# Patient Record
Sex: FEMALE | Race: WHITE | NOT HISPANIC OR LATINO | Employment: OTHER | URBAN - METROPOLITAN AREA
[De-identification: names, ages, dates, MRNs, and addresses within clinical notes are randomized per-mention and may not be internally consistent; named-entity substitution may affect disease eponyms.]

---

## 2017-09-18 DIAGNOSIS — Z12.31 OTHER SCREENING MAMMOGRAM: ICD-10-CM

## 2018-10-02 ENCOUNTER — HOSPITAL ENCOUNTER (OUTPATIENT)
Facility: HOSPITAL | Age: 73
Discharge: HOSPICE | End: 2018-10-03
Attending: EMERGENCY MEDICINE | Admitting: INTERNAL MEDICINE
Payer: MEDICARE

## 2018-10-02 DIAGNOSIS — C54.1 ADENOCARCINOMA OF THE ENDOMETRIUM/UTERUS: ICD-10-CM

## 2018-10-02 DIAGNOSIS — E83.52 HYPERCALCEMIA: Primary | ICD-10-CM

## 2018-10-02 DIAGNOSIS — C79.9 METASTATIC ADENOCARCINOMA: ICD-10-CM

## 2018-10-02 LAB
BASOPHILS # BLD AUTO: 0.01 K/UL
BASOPHILS NFR BLD: 0.1 %
DIFFERENTIAL METHOD: ABNORMAL
EOSINOPHIL # BLD AUTO: 0 K/UL
EOSINOPHIL NFR BLD: 0 %
ERYTHROCYTE [DISTWIDTH] IN BLOOD BY AUTOMATED COUNT: 20.3 %
HCT VFR BLD AUTO: 32.9 %
HGB BLD-MCNC: 10.6 G/DL
LYMPHOCYTES # BLD AUTO: 1.8 K/UL
LYMPHOCYTES NFR BLD: 11.1 %
MCH RBC QN AUTO: 25.4 PG
MCHC RBC AUTO-ENTMCNC: 32.2 G/DL
MCV RBC AUTO: 79 FL
MONOCYTES # BLD AUTO: 2 K/UL
MONOCYTES NFR BLD: 12.3 %
NEUTROPHILS # BLD AUTO: 12.1 K/UL
NEUTROPHILS NFR BLD: 76.5 %
PLATELET # BLD AUTO: 387 K/UL
PMV BLD AUTO: 10.3 FL
RBC # BLD AUTO: 4.17 M/UL
WBC # BLD AUTO: 15.81 K/UL

## 2018-10-02 PROCEDURE — 80053 COMPREHEN METABOLIC PANEL: CPT

## 2018-10-02 PROCEDURE — 96361 HYDRATE IV INFUSION ADD-ON: CPT

## 2018-10-02 PROCEDURE — 99285 EMERGENCY DEPT VISIT HI MDM: CPT

## 2018-10-02 PROCEDURE — 96374 THER/PROPH/DIAG INJ IV PUSH: CPT

## 2018-10-02 PROCEDURE — 93010 ELECTROCARDIOGRAM REPORT: CPT | Mod: ,,, | Performed by: INTERNAL MEDICINE

## 2018-10-02 PROCEDURE — 83690 ASSAY OF LIPASE: CPT

## 2018-10-02 PROCEDURE — 85025 COMPLETE CBC W/AUTO DIFF WBC: CPT

## 2018-10-03 VITALS
WEIGHT: 194.19 LBS | OXYGEN SATURATION: 95 % | HEIGHT: 62 IN | BODY MASS INDEX: 35.73 KG/M2 | DIASTOLIC BLOOD PRESSURE: 92 MMHG | RESPIRATION RATE: 22 BRPM | TEMPERATURE: 98 F | HEART RATE: 113 BPM | SYSTOLIC BLOOD PRESSURE: 152 MMHG

## 2018-10-03 PROBLEM — E83.52 HYPERCALCEMIA: Status: ACTIVE | Noted: 2018-10-03

## 2018-10-03 PROBLEM — C79.9 METASTATIC ADENOCARCINOMA: Status: ACTIVE | Noted: 2018-10-03

## 2018-10-03 PROBLEM — G93.41 ACUTE METABOLIC ENCEPHALOPATHY: Status: ACTIVE | Noted: 2018-10-03

## 2018-10-03 PROBLEM — C54.1 ENDOMETRIAL CARCINOMA: Status: ACTIVE | Noted: 2018-10-03

## 2018-10-03 PROBLEM — C54.1 ADENOCARCINOMA OF THE ENDOMETRIUM/UTERUS: Status: ACTIVE | Noted: 2018-10-03

## 2018-10-03 LAB
ALBUMIN SERPL BCP-MCNC: 2.7 G/DL
ALP SERPL-CCNC: 75 U/L
ALT SERPL W/O P-5'-P-CCNC: 13 U/L
ANION GAP SERPL CALC-SCNC: 11 MMOL/L
AST SERPL-CCNC: 51 U/L
BILIRUB SERPL-MCNC: 0.5 MG/DL
BILIRUB UR QL STRIP: NEGATIVE
BUN SERPL-MCNC: 30 MG/DL
CALCIUM SERPL-MCNC: 16 MG/DL
CHLORIDE SERPL-SCNC: 99 MMOL/L
CLARITY UR: CLEAR
CO2 SERPL-SCNC: 27 MMOL/L
COLOR UR: YELLOW
CREAT SERPL-MCNC: 0.7 MG/DL
EST. GFR  (AFRICAN AMERICAN): >60 ML/MIN/1.73 M^2
EST. GFR  (NON AFRICAN AMERICAN): >60 ML/MIN/1.73 M^2
GLUCOSE SERPL-MCNC: 102 MG/DL
GLUCOSE UR QL STRIP: NEGATIVE
HGB UR QL STRIP: NEGATIVE
KETONES UR QL STRIP: NEGATIVE
LACTATE SERPL-SCNC: 1.7 MMOL/L
LEUKOCYTE ESTERASE UR QL STRIP: NEGATIVE
LIPASE SERPL-CCNC: 87 U/L
NITRITE UR QL STRIP: NEGATIVE
PH UR STRIP: 6 [PH] (ref 5–8)
POTASSIUM SERPL-SCNC: 3.7 MMOL/L
PROT SERPL-MCNC: 7.6 G/DL
PROT UR QL STRIP: NEGATIVE
SODIUM SERPL-SCNC: 137 MMOL/L
SP GR UR STRIP: 1.02 (ref 1–1.03)
URN SPEC COLLECT METH UR: NORMAL
UROBILINOGEN UR STRIP-ACNC: NEGATIVE EU/DL

## 2018-10-03 PROCEDURE — 87040 BLOOD CULTURE FOR BACTERIA: CPT

## 2018-10-03 PROCEDURE — G0378 HOSPITAL OBSERVATION PER HR: HCPCS

## 2018-10-03 PROCEDURE — 63600175 PHARM REV CODE 636 W HCPCS: Performed by: INTERNAL MEDICINE

## 2018-10-03 PROCEDURE — 25000003 PHARM REV CODE 250: Performed by: INTERNAL MEDICINE

## 2018-10-03 PROCEDURE — 81003 URINALYSIS AUTO W/O SCOPE: CPT

## 2018-10-03 PROCEDURE — 25000003 PHARM REV CODE 250: Performed by: EMERGENCY MEDICINE

## 2018-10-03 PROCEDURE — 83605 ASSAY OF LACTIC ACID: CPT

## 2018-10-03 RX ORDER — MORPHINE SULFATE 4 MG/ML
4 INJECTION, SOLUTION INTRAMUSCULAR; INTRAVENOUS EVERY 4 HOURS PRN
Status: DISCONTINUED | OUTPATIENT
Start: 2018-10-03 | End: 2018-10-03 | Stop reason: HOSPADM

## 2018-10-03 RX ORDER — SODIUM CHLORIDE 9 MG/ML
INJECTION, SOLUTION INTRAVENOUS CONTINUOUS
Status: DISCONTINUED | OUTPATIENT
Start: 2018-10-03 | End: 2018-10-03 | Stop reason: HOSPADM

## 2018-10-03 RX ADMIN — SODIUM CHLORIDE 1000 ML: 0.9 INJECTION, SOLUTION INTRAVENOUS at 12:10

## 2018-10-03 RX ADMIN — SODIUM CHLORIDE: 0.9 INJECTION, SOLUTION INTRAVENOUS at 06:10

## 2018-10-03 RX ADMIN — MORPHINE SULFATE 4 MG: 4 INJECTION INTRAVENOUS at 10:10

## 2018-10-03 NOTE — ASSESSMENT & PLAN NOTE
Reviewed recent endometrial and cervical mass biopsy done at [facility] on 09/27/2018.  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  With extensive metastases as mentioned above.

## 2018-10-03 NOTE — H&P
[facility] Medicine  History & Physical    Patient Name: Vannessa Ryan  MRN: [id number]  Admission Date: 10/2/2018  Attending Physician: Bhavin Alcazar MD  Primary Care Provider: Matilde Best MD         Patient information was obtained from patient, relative(s), past medical records and ER records.     Subjective:     Principal Problem:Hypercalcemia of malignancy    Chief Complaint:   Chief Complaint   Patient presents with    Altered Mental Status     new admit to NH today, ems state the NH says she does not fit their reported baseline. daughter enroute        HPI: Ms. Ryan is a 72-year-old  female, initially admitted on 9/24/18 to [facility] after sustaining a fall.  She was diagnosed with sepsis secondary to pneumonia.  Initial chest x-ray revealed multiple nodules.  Follow-up CT chest abdomen and pelvis revealed extensive metastatic disease.  Patient underwent transvaginal ultrasound on 9/26/18 that showed endometrial and cervical masses.  Patient underwent biopsy on 9/27/18, final report came back on 10/01/18 with invasive endometrial adenocarcinoma.  Patient was discharged to [facility] on 10/02/18, to be followed up by Dr. Mejia (gynecological oncology).  Patient was unable to see Dr. Mejia yesterday.  Patient was sent to the [facility] ED due to worsening confusion and SNF's inability to care for the patient due to high acuity of care.  Patient has been at [facility] for less than 24 hr.  Patient was sent to the ED for further evaluation.    CT head done today is negative for metastatic disease. Reviewed imaging studies from outside facility ([facility]), revealed extensive metastatic disease involving the thoracic and lumbar spine with lytic lesions, extensive abdominal lymphadenopathy, mediastinal lymphadenopathy, multiple metastatic pulmonary nodules.  Laboratory data revealed calcium of 16.  Last  calcium 13.3 on 10/01/18 at [facility].    Discussed with patient's two daughters and one son in law at the bedside.  Patient is a DNR/DNI.  Discussed therapeutic options versus comfort care.  Patient's two daughters and son-in-law in agreement with keeping their mother comfortable, requested for hospice.  In fact they are pursuing two hospice facilities and will let  know the choice of hospice agency later this morning.      Past Medical History:   Diagnosis Date    Allergy     Cataract     Osteoporosis     Spine    Primary osteoarthritis of right knee 5/9/2015    Suicide and self-inflicted injury by other specified means     Tried to starve her self 23 years ago after a divorce. Hospitilized for one day. No residual probems now.     Type II or unspecified type diabetes mellitus without mention of complication, not stated as uncontrolled     Uterine cancer        Past Surgical History:   Procedure Laterality Date    APPENDECTOMY      EYE SURGERY  06/03/2014    IUD removal      KNEE SURGERY         Review of patient's allergies indicates:   Allergen Reactions    Sulfa (sulfonamide antibiotics) Anaphylaxis    Alendronate Diarrhea    Penicillins Hives    Shellfish containing products Swelling     nausea       No current facility-administered medications on file prior to encounter.      Current Outpatient Medications on File Prior to Encounter   Medication Sig    aspirin 81 MG Chew Take 81 mg by mouth once daily.    blood sugar diagnostic Strp Check Blood sugar before breakfast and 2 hours after biggest meal    lancets (ACCU-CHEK SOFTCLIX LANCETS) Misc Check Blood sugar before breakfast and 2 hours after biggest meal    blood-glucose meter (FREESTYLE SYSTEM KIT) kit Use as instructed    pravastatin (PRAVACHOL) 10 MG tablet Take 1 tablet (10 mg total) by mouth once daily.    [DISCONTINUED] diclofenac sodium (VOLTAREN) 1 % Gel Apply 2 g topically once daily.    [DISCONTINUED] naproxen sodium  (ANAPROX) 220 MG tablet Take 220 mg by mouth 2 (two) times daily with meals.     Family History     Problem Relation (Age of Onset)    Diabetes Mother    Heart disease Father    Stroke Brother, Brother        Tobacco Use    Smoking status: Never Smoker    Smokeless tobacco: Never Used   Substance and Sexual Activity    Alcohol use: No    Drug use: No    Sexual activity: No     Review of Systems   Unable to perform ROS: Mental status change     Objective:     Vital Signs (Most Recent):  Temp: 97.9 °F (36.6 °C) (10/02/18 2235)  Pulse: 104 (10/03/18 0515)  Resp: (!) 22 (10/03/18 0230)  BP: (!) 156/67 (10/03/18 0332)  SpO2: 95 % (10/03/18 0332) Vital Signs (24h Range):  Temp:  [97.9 °F (36.6 °C)] 97.9 °F (36.6 °C)  Pulse:  [] 104  Resp:  [18-22] 22  SpO2:  [95 %-98 %] 95 %  BP: (133-164)/(59-74) 156/67     Weight: 88.1 kg (194 lb 3.2 oz)  Body mass index is 35.52 kg/m².    Physical Exam   Constitutional: She appears lethargic. No distress.   Elderly encephalopathic,  female.  Two daughters and a son-in-law at the bedside.   HENT:   Head: Normocephalic and atraumatic.   Eyes: Conjunctivae and EOM are normal. No scleral icterus.   Neck: Normal range of motion.   Cardiovascular: Normal rate and intact distal pulses. A regularly irregular rhythm present.   No murmur heard.  Pulmonary/Chest: No tachypnea. No respiratory distress. She has rhonchi.   Abdominal: Soft. She exhibits no distension. There is no tenderness.   Musculoskeletal: She exhibits no edema.   Neurological: She appears lethargic. She is disoriented. She exhibits normal muscle tone.   Skin: Skin is warm. She is not diaphoretic. No erythema.   Nursing note and vitals reviewed.        CRANIAL NERVES     CN III, IV, VI   Extraocular motions are normal.        Significant Labs:   BMP:   Recent Labs   Lab  10/02/18   2254   GLU  102   NA  137   K  3.7   CL  99   CO2  27   BUN  30*   CREATININE  0.7   CALCIUM  16.0*     CBC:   Recent Labs   Lab   10/02/18   2254   WBC  15.81*   HGB  10.6*   HCT  32.9*   PLT  387*     CMP:   Recent Labs   Lab  10/02/18   2254   NA  137   K  3.7   CL  99   CO2  27   GLU  102   BUN  30*   CREATININE  0.7   CALCIUM  16.0*   PROT  7.6   ALBUMIN  2.7*   BILITOT  0.5   ALKPHOS  75   AST  51*   ALT  13   ANIONGAP  11   EGFRNONAA  >60     Lactic Acid:   Recent Labs   Lab  10/03/18   0007   LACTATE  1.7     All pertinent labs within the past 24 hours have been reviewed.    Significant Imaging: I have reviewed and interpreted all pertinent imaging results/findings within the past 24 hours.     Imaging Results          CT Head Without Contrast (Final result)  Result time 10/02/18 23:47:38    Final result by Dav Miller MD (10/02/18 23:47:38)                 Impression:      No acute findings.    All CT scans at this facility are performed  using dose modulation techniques as appropriate to performed exam including the following:  automated exposure control; adjustment of mA and/or kV according to the patients size (this includes techniques or standardized protocols for targeted exams where dose is matched to indication/reason for exam: i.e. extremities or head);  iterative reconstruction technique.      Electronically signed by: Dav Miller MD  Date:    10/02/2018  Time:    23:47             Narrative:    EXAMINATION:  CT HEAD WITHOUT CONTRAST    CLINICAL HISTORY:  Confusion/delirium, altered LOC, unexplained;    FINDINGS:  No intracranial hemorrhage or acute findings are demonstrated.  Mild cerebral atrophy.  Low-density changes in the white matter suggests chronic small vessel ischemia.  The visualized paranasal sinuses are clear. The calvarium is intact.                               X-Ray Chest AP Portable (Final result)  Result time 10/02/18 23:28:49    Final result by Dav Miller MD (10/02/18 23:28:49)                 Impression:      Innumerable bilateral pulmonary nodules raising the question of metastatic  disease.      Electronically signed by: Dav Miller MD  Date:    10/02/2018  Time:    23:28             Narrative:    EXAMINATION:  XR CHEST AP PORTABLE    CLINICAL HISTORY:  chest pain;    FINDINGS:  Heart is normal in size.  The aorta is atherosclerotic.  There are 2 pulmonary nodules scattered throughout both lung fields raising the question of diffuse metastatic disease.                              09/26/2018:  Transvaginal ultrasound of the pelvis:  Centrally trying hyperechoic mass representing an endometrial mass.  Neither ovary is identified.    09/25/2018: CT CHEST ABDOMEN PELVIS W CONTRAST        CHEST: The lungs demonstrate innumerable pulmonary nodules, ranging in size from a few millimeters up to 2.9 cm and the right lower lobe. Some of the larger nodules. Have some central necrosis and possible early central cavitation, for example and medial    left lower lobe nodule on image 42 of series 2. There is atelectasis/consolidation in the dependent portions of the lungs. Negative for pleural or pericardial effusions.        Multiple enlarged hilar and mediastinal lymph nodes, the largest of which is in the subcarinal region measuring 3.2 cm in greatest length with central necrosis. Negative for inferior neck or axillary lymph nodes. The airways are patent. The thyroid gland    is normal. The esophagus is normal.         ABDOMEN: The gallbladder is full of gallstones. There is fatty infiltration of the pancreas. The liver is heterogeneous in appearance, with a low density nodule or cyst in the caudate lobe measuring 1 cm. The liver, spleen and gallbladder otherwise     appear normal. The pancreas is unremarkable. Kidneys and adrenal glands are normal. The biliary tree is normal.        There are multiple enlarged necrotic retroperitoneal and bilateral iliac chain lymph nodes, the largest of which is in the left jugular chain measuring 2.3 cm on image 99 of series 2.        Negative for ascites or  inflammatory changes noted within the abdomen or pelvis.        Vascular calcifications are present without aneurysmal change. The portal vein is patent.        The bowel appears normal. I do not see a normal or an abnormal appendix. Scattered clonic diverticula.        PELVIS: The urinary bladder is unremarkable.         There is a device in the uterine cavity, which is adjacent to an irregular hypodense mass that appears to be centered in the endometrium, which measures approximately 6.6 x 6.2 x 5.2 cm. The rest of the female pelvic organs are normal. Numerous pelvic     phleboliths noted.        Atrophy of the abdominal wall musculature. Tiny fat filled umbilical hernia. Abdominal wall is otherwise intact.        There is diffuse heterogeneity to the lower thoracic and lumbar vertebral bodies and bilateral pelvis which could be related to osteopenia versus innumerable bone lesions. Marked degenerative changes of both shoulder girdles, with left greater than right    shoulder joint effusions noted, with intra-articular loose bodies present. Negative for significant spinal canal stenosis.         Negative for groin adenopathy.        IMPRESSION:     1. Markedly abnormal study. There are in numerable pulmonary nodules ranging in size from a few millimeters up to 2.9 cm in the right lower lobe. Many of the larger nodules are essentially necrotic one appearing to be frankly cavitary in the medial right    lower lobe. This is associated with multiple enlarged necrotic appearing mediastinal lymph nodes (largest in the subcarinal region measuring 3.2 cm), as well as retroperitoneal and iliac chain lymph nodes (largest of which measures 2.3 cm on the left     jugular chain). There is a 6.6 x 6.2 x 5.2 cm soft tissue mass involving the uterus, replacing the endometrium and surrounding an intrauterine device. There is heterogeneity to the lower thoracic and lumbar spine vertebra as well as the bilateral     hemipelvis,  which could be related to marked disuse osteopenia versus innumerable tiny bony lytic lesions. Findings are most consistent with a widely metastatic process involving multiple chest and abdominal lymph nodes, all of the lungs and most likely     most of the osseous structures visualized, possibly arising from an endometrial source.    2. There is dependent atelectasis in the lung bases.    3. There are marked degenerative changes of both shoulder girdles, with large lateral shoulder joint effusions and intrathecal loose bodies.    4. Gallbladder completely full of gallstones.    5. Other nonemergent findings as described above.        2018:  Surgical pathology report, endometrial mass and cervical mass biopsy:    Department of Pathology                                             SURGICAL PATHOLOGY REPORT                Allan Michaud MD                                                                                      Medical Director                                                                                      Phone: [phone]                                                                                      Fax: [phone]  Name:       LETICIA TRAN  Case#:      MILLI-                                                Date Collected:  2018, 10:59  :        1945   Age: 72 Y   Sex: F                            Date Received:   2018, 13:12  MR#:        [id number]                                                    Service:         MEDU    Room#: 3216  Adm. Dr:    CYNTHIA MOYER, hospitalist                              Acct#:           [id number]  Req. Dr:    CLAU ALFONSO  .  .  ADDENDUM 1:   Dr. Alfonso's nurse Dominic was notified of this assessment at 11:05 AM on 10/1/2018.  Electronically Signed By:  ALLAN MICHAUD MD  Reported: 10/01/2018  11:10  .  FINAL DIAGNOSIS:  A: Cervical mass, resection: Invasive endometrial adenocarcinoma, FIGO grade 2-3.  B: Endometrial  "mass, curettage: Invasive endometrial adenocarcinoma, FIGO grade 2-3.  C: Gross diagnosis only: Intrauterine contraceptive device.  .  Comment:  Another staff pathologist has reviewed specimen A.  Electronically Signed By:  BRAVO MICHAUD MD  Reported: 09/28/2018  15:56  .  .  Specimen(s) Submitted:  (A) CERVIX BIOPSY , Cervical Mass  (B) ENDOMETRIAL BIOPSY , Endometrial Mass  (C) HARDWARE , Inrauterine Device  .  Clinical History:  Preop diagnosis: Postmenopausal bleeding, Endometrial mass, Intrauterine device  Time in formalin greater than 6 hours and less than 48 hours.  .  Gross Description:  A: Specimen is received in formalin labeled patient's name, medical record number, and "cervical mass", and consists  of Telfa pad with pieces of pink polypoid tissue that are 1.5 x 0.7 x 0.3 cm in aggregate. Specimen is entirely  submitted in one cassette.  B: Specimen is received in formalin labeled patient's name, medical record number, and "endometrial polyp", and  consists of Telfa pad with pieces of tissue and mucus that are 3 x 2.5 x 0.5 cm in aggregate. Specimen is entirely  submitted in one cassette.  C: Specimen is received in formalin labeled patient's name, medical record number, and "intrauterine device", and  consists of a beige plastic T shaped specimen, the horizontal aspects of the T are coiled inward. The diameter is 0.2  cm, it is 7.2 cm vertically; the T-shaped limbs are 5.5 and 6 cm in length. Specimen is submitted for gross  identification only.  ..  Microscopic Description:  A, B: Gland forming tumor and solid areas of tumor demonstrate moderate to marked nuclear  pleomorphism and frequent mitotic figures. In specimen A, tumor cells in both glandular and solid areas demonstrate  positive staining for vimentin. Approximately 20-30% of tumor cells stain with Ki-67. Tumor cells stain negatively  with p63. Adequate immunostain controls were provided.  .  Technical-Part A  88300 x 1  88305 x 2  10003 x " 1  88341 x 2  Professional-Part B  88300 x 1  88305 x 2  88342 x 1  88341 x 2  [facility]  *  [id number]  *  [place] [address]  *  [facility]# [id number].        I have independently reviewed and interpreted the EKG.     I have independently reviewed all pertinent labs within the past 24 hours.    I have independently reviewed, visualized and interpreted all pertinent imaging results within the past 24 hours and discussed the findings with the ED physician, Dr. Grace.      Assessment/Plan:     * Hypercalcemia of malignancy    Calcium 16 today.  Recently was 13 two days ago.  Unclear patient received bisphosphonate at [facility].  Received 2 L normal saline boluses in the ED.  Continue with normal saline at 125 cc/hour.  Family choosing to proceed with comfort care/hospice.          Acute metabolic encephalopathy    Secondary to severe hypercalcemia of malignancy.  CT head negative for metastases.        Adenocarcinoma of the endometrium/uterus    Reviewed recent endometrial and cervical mass biopsy done at [facility] on 09/27/2018.  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  With extensive metastases as mentioned above.          Metastatic adenocarcinoma    Extensive metastases to bilateral lungs, thoracic and lumbar spine, mediastinal lymphadenopathy, intra-abdominal lymphadenopathy.  Patient currently encephalopathic from hypercalcemia, 16.  Family wishes to proceed with comfort care.   consulted for hospice placement.            VTE Risk Mitigation (From admission, onward)        Ordered     IP VTE HIGH RISK PATIENT  Once      10/03/18 0200             Bhavin Alcazar MD  Department of Hospital Medicine   [facility]

## 2018-10-03 NOTE — SUBJECTIVE & OBJECTIVE
Past Medical History:   Diagnosis Date    Allergy     Cataract     Osteoporosis     Spine    Primary osteoarthritis of right knee 5/9/2015    Suicide and self-inflicted injury by other specified means     Tried to starve her self 23 years ago after a divorce. Hospitilized for one day. No residual probems now.     Type II or unspecified type diabetes mellitus without mention of complication, not stated as uncontrolled     Uterine cancer        Past Surgical History:   Procedure Laterality Date    APPENDECTOMY      EYE SURGERY  06/03/2014    IUD removal      KNEE SURGERY         Review of patient's allergies indicates:   Allergen Reactions    Sulfa (sulfonamide antibiotics) Anaphylaxis    Alendronate Diarrhea    Penicillins Hives    Shellfish containing products Swelling     nausea       No current facility-administered medications on file prior to encounter.      Current Outpatient Medications on File Prior to Encounter   Medication Sig    aspirin 81 MG Chew Take 81 mg by mouth once daily.    blood sugar diagnostic Strp Check Blood sugar before breakfast and 2 hours after biggest meal    lancets (ACCU-CHEK SOFTCLIX LANCETS) Misc Check Blood sugar before breakfast and 2 hours after biggest meal    blood-glucose meter (FREESTYLE SYSTEM KIT) kit Use as instructed    pravastatin (PRAVACHOL) 10 MG tablet Take 1 tablet (10 mg total) by mouth once daily.    [DISCONTINUED] diclofenac sodium (VOLTAREN) 1 % Gel Apply 2 g topically once daily.    [DISCONTINUED] naproxen sodium (ANAPROX) 220 MG tablet Take 220 mg by mouth 2 (two) times daily with meals.     Family History     Problem Relation (Age of Onset)    Diabetes Mother    Heart disease Father    Stroke Brother, Brother        Tobacco Use    Smoking status: Never Smoker    Smokeless tobacco: Never Used   Substance and Sexual Activity    Alcohol use: No    Drug use: No    Sexual activity: No     Review of Systems   Unable to perform ROS: Mental  status change     Objective:     Vital Signs (Most Recent):  Temp: 97.9 °F (36.6 °C) (10/02/18 2235)  Pulse: 104 (10/03/18 0515)  Resp: (!) 22 (10/03/18 0230)  BP: (!) 156/67 (10/03/18 0332)  SpO2: 95 % (10/03/18 0332) Vital Signs (24h Range):  Temp:  [97.9 °F (36.6 °C)] 97.9 °F (36.6 °C)  Pulse:  [] 104  Resp:  [18-22] 22  SpO2:  [95 %-98 %] 95 %  BP: (133-164)/(59-74) 156/67     Weight: 88.1 kg (194 lb 3.2 oz)  Body mass index is 35.52 kg/m².    Physical Exam   Constitutional: She appears lethargic. No distress.   Elderly encephalopathic,  female.  Two daughters and a son-in-law at the bedside.   HENT:   Head: Normocephalic and atraumatic.   Eyes: Conjunctivae and EOM are normal. No scleral icterus.   Neck: Normal range of motion.   Cardiovascular: Normal rate and intact distal pulses. A regularly irregular rhythm present.   No murmur heard.  Pulmonary/Chest: No tachypnea. No respiratory distress. She has rhonchi.   Abdominal: Soft. She exhibits no distension. There is no tenderness.   Musculoskeletal: She exhibits no edema.   Neurological: She appears lethargic. She is disoriented. She exhibits normal muscle tone.   Skin: Skin is warm. She is not diaphoretic. No erythema.   Nursing note and vitals reviewed.        CRANIAL NERVES     CN III, IV, VI   Extraocular motions are normal.        Significant Labs:   BMP:   Recent Labs   Lab  10/02/18   2254   GLU  102   NA  137   K  3.7   CL  99   CO2  27   BUN  30*   CREATININE  0.7   CALCIUM  16.0*     CBC:   Recent Labs   Lab  10/02/18   2254   WBC  15.81*   HGB  10.6*   HCT  32.9*   PLT  387*     CMP:   Recent Labs   Lab  10/02/18   2254   NA  137   K  3.7   CL  99   CO2  27   GLU  102   BUN  30*   CREATININE  0.7   CALCIUM  16.0*   PROT  7.6   ALBUMIN  2.7*   BILITOT  0.5   ALKPHOS  75   AST  51*   ALT  13   ANIONGAP  11   EGFRNONAA  >60     Lactic Acid:   Recent Labs   Lab  10/03/18   0007   LACTATE  1.7     All pertinent labs within the past 24 hours  have been reviewed.    Significant Imaging: I have reviewed and interpreted all pertinent imaging results/findings within the past 24 hours.     Imaging Results          CT Head Without Contrast (Final result)  Result time 10/02/18 23:47:38    Final result by Dav Miller MD (10/02/18 23:47:38)                 Impression:      No acute findings.    All CT scans at this facility are performed  using dose modulation techniques as appropriate to performed exam including the following:  automated exposure control; adjustment of mA and/or kV according to the patients size (this includes techniques or standardized protocols for targeted exams where dose is matched to indication/reason for exam: i.e. extremities or head);  iterative reconstruction technique.      Electronically signed by: Dav Miller MD  Date:    10/02/2018  Time:    23:47             Narrative:    EXAMINATION:  CT HEAD WITHOUT CONTRAST    CLINICAL HISTORY:  Confusion/delirium, altered LOC, unexplained;    FINDINGS:  No intracranial hemorrhage or acute findings are demonstrated.  Mild cerebral atrophy.  Low-density changes in the white matter suggests chronic small vessel ischemia.  The visualized paranasal sinuses are clear. The calvarium is intact.                               X-Ray Chest AP Portable (Final result)  Result time 10/02/18 23:28:49    Final result by Dav Miller MD (10/02/18 23:28:49)                 Impression:      Innumerable bilateral pulmonary nodules raising the question of metastatic disease.      Electronically signed by: Dav Miller MD  Date:    10/02/2018  Time:    23:28             Narrative:    EXAMINATION:  XR CHEST AP PORTABLE    CLINICAL HISTORY:  chest pain;    FINDINGS:  Heart is normal in size.  The aorta is atherosclerotic.  There are 2 pulmonary nodules scattered throughout both lung fields raising the question of diffuse metastatic disease.                              09/26/2018:  Transvaginal ultrasound of the  pelvis:  Centrally trying hyperechoic mass representing an endometrial mass.  Neither ovary is identified.    09/25/2018: CT CHEST ABDOMEN PELVIS W CONTRAST        CHEST: The lungs demonstrate innumerable pulmonary nodules, ranging in size from a few millimeters up to 2.9 cm and the right lower lobe. Some of the larger nodules. Have some central necrosis and possible early central cavitation, for example and medial    left lower lobe nodule on image 42 of series 2. There is atelectasis/consolidation in the dependent portions of the lungs. Negative for pleural or pericardial effusions.        Multiple enlarged hilar and mediastinal lymph nodes, the largest of which is in the subcarinal region measuring 3.2 cm in greatest length with central necrosis. Negative for inferior neck or axillary lymph nodes. The airways are patent. The thyroid gland    is normal. The esophagus is normal.         ABDOMEN: The gallbladder is full of gallstones. There is fatty infiltration of the pancreas. The liver is heterogeneous in appearance, with a low density nodule or cyst in the caudate lobe measuring 1 cm. The liver, spleen and gallbladder otherwise     appear normal. The pancreas is unremarkable. Kidneys and adrenal glands are normal. The biliary tree is normal.        There are multiple enlarged necrotic retroperitoneal and bilateral iliac chain lymph nodes, the largest of which is in the left jugular chain measuring 2.3 cm on image 99 of series 2.        Negative for ascites or inflammatory changes noted within the abdomen or pelvis.        Vascular calcifications are present without aneurysmal change. The portal vein is patent.        The bowel appears normal. I do not see a normal or an abnormal appendix. Scattered clonic diverticula.        PELVIS: The urinary bladder is unremarkable.         There is a device in the uterine cavity, which is adjacent to an irregular hypodense mass that appears to be centered in the endometrium,  which measures approximately 6.6 x 6.2 x 5.2 cm. The rest of the female pelvic organs are normal. Numerous pelvic     phleboliths noted.        Atrophy of the abdominal wall musculature. Tiny fat filled umbilical hernia. Abdominal wall is otherwise intact.        There is diffuse heterogeneity to the lower thoracic and lumbar vertebral bodies and bilateral pelvis which could be related to osteopenia versus innumerable bone lesions. Marked degenerative changes of both shoulder girdles, with left greater than right    shoulder joint effusions noted, with intra-articular loose bodies present. Negative for significant spinal canal stenosis.         Negative for groin adenopathy.        IMPRESSION:     1. Markedly abnormal study. There are in numerable pulmonary nodules ranging in size from a few millimeters up to 2.9 cm in the right lower lobe. Many of the larger nodules are essentially necrotic one appearing to be frankly cavitary in the medial right    lower lobe. This is associated with multiple enlarged necrotic appearing mediastinal lymph nodes (largest in the subcarinal region measuring 3.2 cm), as well as retroperitoneal and iliac chain lymph nodes (largest of which measures 2.3 cm on the left     jugular chain). There is a 6.6 x 6.2 x 5.2 cm soft tissue mass involving the uterus, replacing the endometrium and surrounding an intrauterine device. There is heterogeneity to the lower thoracic and lumbar spine vertebra as well as the bilateral     hemipelvis, which could be related to marked disuse osteopenia versus innumerable tiny bony lytic lesions. Findings are most consistent with a widely metastatic process involving multiple chest and abdominal lymph nodes, all of the lungs and most likely     most of the osseous structures visualized, possibly arising from an endometrial source.    2. There is dependent atelectasis in the lung bases.    3. There are marked degenerative changes of both shoulder girdles, with  large lateral shoulder joint effusions and intrathecal loose bodies.    4. Gallbladder completely full of gallstones.    5. Other nonemergent findings as described above.        2018:  Surgical pathology report, endometrial mass and cervical mass biopsy:    Department of Pathology                                             SURGICAL PATHOLOGY REPORT                Allan Michaud MD                                                                                      Medical Director                                                                                      Phone: [phone]                                                                                      Fax: [phone]  Name:       LETICIA TRAN  Case#:      MILLI-                                                Date Collected:  2018, 10:59  :        1945   Age: 72 Y   Sex: F                            Date Received:   2018, 13:12  MR#:        [id number]                                                    Service:         MEDU    Room#: 3216  Adm. Dr:    CYNTHIA MOYER, hospitalist                              Acct#:           [id number]  Req. Dr:    CLAU ALFONSO  .  .  ADDENDUM 1:   Dr. Alfonso's nurse Dominic was notified of this assessment at 11:05 AM on 10/1/2018.  Electronically Signed By:  ALLAN MICHAUD MD  Reported: 10/01/2018  11:10  .  FINAL DIAGNOSIS:  A: Cervical mass, resection: Invasive endometrial adenocarcinoma, FIGO grade 2-3.  B: Endometrial mass, curettage: Invasive endometrial adenocarcinoma, FIGO grade 2-3.  C: Gross diagnosis only: Intrauterine contraceptive device.  .  Comment:  Another staff pathologist has reviewed specimen A.  Electronically Signed By:  ALLAN MICHAUD MD  Reported: 2018  15:56  .  .  Specimen(s) Submitted:  (A) CERVIX BIOPSY , Cervical Mass  (B) ENDOMETRIAL BIOPSY , Endometrial Mass  (C) HARDWARE , Inrauterine Device  .  Clinical History:  Preop diagnosis: Postmenopausal  "bleeding, Endometrial mass, Intrauterine device  Time in formalin greater than 6 hours and less than 48 hours.  .  Gross Description:  A: Specimen is received in formalin labeled patient's name, medical record number, and "cervical mass", and consists  of Telfa pad with pieces of pink polypoid tissue that are 1.5 x 0.7 x 0.3 cm in aggregate. Specimen is entirely  submitted in one cassette.  B: Specimen is received in formalin labeled patient's name, medical record number, and "endometrial polyp", and  consists of Telfa pad with pieces of tissue and mucus that are 3 x 2.5 x 0.5 cm in aggregate. Specimen is entirely  submitted in one cassette.  C: Specimen is received in formalin labeled patient's name, medical record number, and "intrauterine device", and  consists of a beige plastic T shaped specimen, the horizontal aspects of the T are coiled inward. The diameter is 0.2  cm, it is 7.2 cm vertically; the T-shaped limbs are 5.5 and 6 cm in length. Specimen is submitted for gross  identification only.  ..  Microscopic Description:  A, B: Gland forming tumor and solid areas of tumor demonstrate moderate to marked nuclear  pleomorphism and frequent mitotic figures. In specimen A, tumor cells in both glandular and solid areas demonstrate  positive staining for vimentin. Approximately 20-30% of tumor cells stain with Ki-67. Tumor cells stain negatively  with p63. Adequate immunostain controls were provided.  .  Technical-Part A  88300 x 1  88305 x 2  88342 x 1  88341 x 2  Professional-Part B  88300 x 1  88305 x 2  88342 x 1  88341 x 2  [facility]  *  [address]  *  [place] [address]  *  [facility]# [id number].      "

## 2018-10-03 NOTE — ASSESSMENT & PLAN NOTE
Calcium 16 today.  Recently was 13 two days ago.  Unclear patient received bisphosphonate at [facility].  Received 2 L normal saline boluses in the ED.  Continue with normal saline at 125 cc/hour.  Family choosing to proceed with comfort care/hospice.

## 2018-10-03 NOTE — PROGRESS NOTES
Called to give report to Vinnie or Melany at [facility], both were in patient's rooms at the time and my name and spectralink number were left with the  to call back. Will continue to monitor. Patient resting comfortably and family remains at bedside.

## 2018-10-03 NOTE — PLAN OF CARE
Problem: Patient Care Overview  Goal: Plan of Care Review  Outcome: Ongoing (interventions implemented as appropriate)  Pt in bed AAOx4.   Plan of Care reviewed, Verbalized understanding.  Patient remained free from falls, fall precautions in place.   Pt is A-Fib on monitor. VSS.   PIV CDI with NS running at 125ml/hr.   Call bell and personal belongings within reach.   Hourly rounding complete. Reminded to call for assistance.   No complaints at this time.   Will continue to monitor.

## 2018-10-03 NOTE — ASSESSMENT & PLAN NOTE
Extensive metastases to bilateral lungs, thoracic and lumbar spine, mediastinal lymphadenopathy, intra-abdominal lymphadenopathy.  Patient currently encephalopathic from hypercalcemia, 16.  Family wishes to proceed with comfort care.   consulted for hospice placement.

## 2018-10-03 NOTE — PLAN OF CARE
CM met with family at bedside to discuss discharge plan/needs. Patient is unresponsive. Family reports patient's condition declined before she could be admitted into [facility]. Family understands patient's condition is likely irreversible and would like to proceed with inpatient hospice. Family chose [facility]. CM obtained choice form and submitted referral to [facility].     Contact: Haley Saavedra, meghana, [phone]       10/03/18 1141   Discharge Assessment   Assessment Type Discharge Planning Assessment   Confirmed/corrected address and phone number on facesheet? Yes   Assessment information obtained from? Caregiver  (Patient unresponsive. Spoke with daughters, Haley and Lexis. )   Communicated expected length of stay with patient/caregiver yes   Prior to hospitilization cognitive status: Coma/Sedated/Intubated   Prior to hospitalization functional status: Completely Dependent   Current cognitive status: Coma/Sedated/Intubated   Current Functional Status: Completely Dependent   Facility Arrived From: ([facility])   Lives With facility resident   Able to Return to Prior Arrangements no   Is patient able to care for self after discharge? No   Who are your caregiver(s) and their phone number(s)? (Haley Saavedra, daughter, [phone])   Patient's perception of discharge disposition hospice/medical facility   Readmission Within The Last 30 Days no previous admission in last 30 days   Patient currently being followed by outpatient case management? No   Patient currently receives any other outside agency services? No   Do you have any problems affording any of your prescribed medications? No   Discharge Plan A Inpatient Hospice   Patient/Family In Agreement With Plan yes

## 2018-10-03 NOTE — DISCHARGE SUMMARY
[facility] Hospital Medicine  Discharge Summary      Patient Name: Vannessa Ryan  MRN: [id number]  Admission Date: 10/2/2018  Hospital Length of Stay: 0 days  Discharge Date and Time:  10/03/2018 3:24 PM  Attending Physician: Mitchell Rosenthal MD   Discharging Provider: Gillian Salmon NP  Primary Care Provider: Matilde Best MD      HPI:   Ms. Ryan is a 72-year-old  female, initially admitted on 9/24/18 to [facility] after sustaining a fall.  She was diagnosed with sepsis secondary to pneumonia.  Initial chest x-ray revealed multiple nodules.  Follow-up CT chest abdomen and pelvis revealed extensive metastatic disease.  Patient underwent transvaginal ultrasound on 9/26/18 that showed endometrial and cervical masses.  Patient underwent biopsy on 9/27/18, final report came back on 10/01/18 with invasive endometrial adenocarcinoma.  Patient was discharged to [facility] on 10/02/18, to be followed up by Dr. Mejia (gynecological oncology).  Patient was unable to see Dr. Mejia yesterday.  Patient was sent to the [facility] ED due to worsening confusion and SNF's inability to care for the patient due to high acuity of care.  Patient has been at [facility] for less than 24 hr.  Patient was sent to the ED for further evaluation.    CT head done today is negative for metastatic disease. Reviewed imaging studies from outside facility ([facility]), revealed extensive metastatic disease involving the thoracic and lumbar spine with lytic lesions, extensive abdominal lymphadenopathy, mediastinal lymphadenopathy, multiple metastatic pulmonary nodules.  Laboratory data revealed calcium of 16.  Last calcium 13.3 on 10/01/18 at [facility].    Discussed with patient's two daughters and one son in law at the bedside.  Patient is a DNR/DNI.  Discussed therapeutic options versus comfort care.  Patient's two daughters and son-in-law in agreement with  keeping their mother comfortable, requested for hospice.  In fact they are pursuing two hospice facilities and will let  know the choice of hospice agency later this morning.      * No surgery found *      Hospital Course:   The pt was placed in observation for hypercalcemia secondary to extensive metastatic disease involving the thoracic and lumbar spine with lytic lesions, extensive abdominal lymphadenopathy, mediastinal lymphadenopathy, multiple metastatic pulmonary nodules.  Laboratory data revealed calcium of 16. Pt is DNR. Discussed therapeutic options versus comfort care. Hospice philosophy discussed with pt's family. Patient's two daughters and son-in-law in agreement and requested inpatient hospice at [facility]. The pt was discharged to the [facility].              Consults:   Consults (From admission, onward)        Status Ordering Provider     Inpatient consult to Social Work  Once     Provider:  (Not yet assigned)    NIRAV Potts                Final Active Diagnoses:    Diagnosis Date Noted POA    PRINCIPAL PROBLEM:  Hypercalcemia of malignancy [E83.52] 10/03/2018 Yes    Adenocarcinoma of the endometrium/uterus [C54.1] 10/03/2018 Yes    Metastatic adenocarcinoma [C79.9] 10/03/2018 Yes    Acute metabolic encephalopathy [G93.41] 10/03/2018 Yes      Problems Resolved During this Admission:       Discharged Condition: stable    Disposition: Hospice/Medical Facility    Follow Up: Hospice provider in one day     Patient Instructions:      Activity as tolerated       Significant Diagnostic Studies:   Imaging Results          CT Head Without Contrast (Final result)  Result time 10/02/18 23:47:38    Final result by Dav Miller MD (10/02/18 23:47:38)                 Impression:      No acute findings.    All CT scans at this facility are performed  using dose modulation techniques as appropriate to performed exam including the following:  automated exposure control;  adjustment of mA and/or kV according to the patients size (this includes techniques or standardized protocols for targeted exams where dose is matched to indication/reason for exam: i.e. extremities or head);  iterative reconstruction technique.      Electronically signed by: Dav Miller MD  Date:    10/02/2018  Time:    23:47             Narrative:    EXAMINATION:  CT HEAD WITHOUT CONTRAST    CLINICAL HISTORY:  Confusion/delirium, altered LOC, unexplained;    FINDINGS:  No intracranial hemorrhage or acute findings are demonstrated.  Mild cerebral atrophy.  Low-density changes in the white matter suggests chronic small vessel ischemia.  The visualized paranasal sinuses are clear. The calvarium is intact.                               X-Ray Chest AP Portable (Final result)  Result time 10/02/18 23:28:49    Final result by Dav Miller MD (10/02/18 23:28:49)                 Impression:      Innumerable bilateral pulmonary nodules raising the question of metastatic disease.      Electronically signed by: Dav Miller MD  Date:    10/02/2018  Time:    23:28             Narrative:    EXAMINATION:  XR CHEST AP PORTABLE    CLINICAL HISTORY:  chest pain;    FINDINGS:  Heart is normal in size.  The aorta is atherosclerotic.  There are 2 pulmonary nodules scattered throughout both lung fields raising the question of diffuse metastatic disease.                                Pending Diagnostic Studies:     None         Medications:  Reconciled Home Medications:      Medication List      STOP taking these medications    aspirin 81 MG Chew     blood sugar diagnostic Strp     blood-glucose meter kit  Commonly known as:  FREESTYLE SYSTEM KIT     diclofenac sodium 1 % Gel  Commonly known as:  VOLTAREN     lancets Misc  Commonly known as:  ACCU-CHEK SOFTCLIX LANCETS     naproxen sodium 220 MG tablet  Commonly known as:  ANAPROX     pravastatin 10 MG tablet  Commonly known as:  PRAVACHOL            Indwelling Lines/Drains at time  of discharge:   Lines/Drains/Airways          None          Time spent on the discharge of patient: 42 minutes  Patient was seen and examined on the date of discharge and determined to be suitable for discharge.         Gillian Salmon NP  Department of Hospital Medicine  [facility]

## 2018-10-03 NOTE — HPI
Ms. Ryan is a 72-year-old  female, initially admitted on 9/24/18 to [facility] after sustaining a fall.  She was diagnosed with sepsis secondary to pneumonia.  Initial chest x-ray revealed multiple nodules.  Follow-up CT chest abdomen and pelvis revealed extensive metastatic disease.  Patient underwent transvaginal ultrasound on 9/26/18 that showed endometrial and cervical masses.  Patient underwent biopsy on 9/27/18, final report came back on 10/01/18 with invasive endometrial adenocarcinoma.  Patient was discharged to [facility] on 10/02/18, to be followed up by Dr. Mejia (gynecological oncology).  Patient was unable to see Dr. Mejia yesterday.  Patient was sent to the [facility] ED due to worsening confusion and SNF's inability to care for the patient due to high acuity of care.  Patient has been at [facility] for less than 24 hr.  Patient was sent to the ED for further evaluation.    CT head done today is negative for metastatic disease. Reviewed imaging studies from outside facility ([facility]), revealed extensive metastatic disease involving the thoracic and lumbar spine with lytic lesions, extensive abdominal lymphadenopathy, mediastinal lymphadenopathy, multiple metastatic pulmonary nodules.  Laboratory data revealed calcium of 16.  Last calcium 13.3 on 10/01/18 at [facility].    Discussed with patient's two daughters and one son in law at the bedside.  Patient is a DNR/DNI.  Discussed therapeutic options versus comfort care.  Patient's two daughters and son-in-law in agreement with keeping their mother comfortable, requested for hospice.  In fact they are pursuing two hospice facilities and will let  know the choice of hospice agency later this morning.

## 2018-10-03 NOTE — PLAN OF CARE
Angela from [facility] contacted [facility] to report pt is their resident. Tia reports pt is their for SNF. She reports pt can return to their facility and they will hold her bed. [facility] sent pt's information via [employer] to [facility].

## 2018-10-03 NOTE — PROGRESS NOTES
Spoke with Vinnie at [facility] and report was given. He will handle getting [facility] to pick patient up for transport to them.

## 2018-10-03 NOTE — ED PROVIDER NOTES
SCRIBE #1 NOTE: I, Sonya De Leon, am scribing for, and in the presence of, Kirti Klein Do, MD. I have scribed the entire note.      History      Chief Complaint   Patient presents with    Altered Mental Status     new admit to NH today, ems state the NH says she does not fit their reported baseline. daughter enroute       Review of patient's allergies indicates:   Allergen Reactions    Sulfa (sulfonamide antibiotics) Anaphylaxis    Alendronate Diarrhea    Penicillins Hives    Shellfish containing products Swelling     nausea        HPI   HPI    10/2/2018, 10:49 PM   History obtained from the daughters      History of Present Illness: Vannessa Ryan is a 72 y.o. female patient who presents to the Emergency Department for worsening AMS which onset gradually a week ago. Daughters report that the patient was evaluated at [facility] by Dr. Wooten after a fall that occurred 1 week ago. Last seen normal was before fall on 09/24/2018. Daughters state that the pt was primarly dx with uterine cancer. Pt was admitted on 09/25/2018 to the hospital for Hypokalemia DDD, Elevated CK, Generalized weakness, Sepsis due to pneumonia (HCC), Pneumonia of both lungs due to infectious organism, and Leukocytosis. Per daughters, pt's AMS increasingly worsened 6 days ago. Daughters state that the pt is not eating, drinking, or talking. Pt was placed at [facility] for rehabilitation and further care today, but was referred to the ED for evaluation of HTN. Pt has not seen an Oncologist. HPI limited due to pt's AMS.       CT Abdomen/Pelvis 09/24/2018  IMPRESSION:   1. Markedly abnormal study. There are in numerable pulmonary nodules ranging in size from a few millimeters up to 2.9 cm in the right lower lobe. Many of the larger nodules are essentially necrotic one appearing to be frankly cavitary in the medial right  lower lobe. This is associated with multiple enlarged necrotic appearing mediastinal lymph nodes  (largest in the subcarinal region measuring 3.2 cm), as well as retroperitoneal and iliac chain lymph nodes (largest of which measures 2.3 cm on the left   jugular chain). There is a 6.6 x 6.2 x 5.2 cm soft tissue mass involving the uterus, replacing the endometrium and surrounding an intrauterine device. There is heterogeneity to the lower thoracic and lumbar spine vertebra as well as the bilateral   hemipelvis, which could be related to marked disuse osteopenia versus innumerable tiny bony lytic lesions. Findings are most consistent with a widely metastatic process involving multiple chest and abdominal lymph nodes, all of the lungs and most likely   most of the osseous structures visualized, possibly arising from an endometrial source.  2. There is dependent atelectasis in the lung bases.  3. There are marked degenerative changes of both shoulder girdles, with large lateral shoulder joint effusions and intrathecal loose bodies.  4. Gallbladder completely full of gallstones.  5. Other nonemergent findings as described above.   Assessment  Invasive endometrial adenocarcinoma, grade 2-3 with suspected metastasis to lungs and lymph nodes      Diagnostic Study Results:  Xr Lumbar Spine 2 Or 3 Vws    Result Date: 9/24/2018  REASON FOR EXAM: pain, fall TECHNICAL FACTORS: Two or three views COMPARISON: None FINDINGS: There is no evidence of acute fracture. There is no evidence of subluxation. There is fusion of the L4 and L5 vertebral bodies. Disc space narrowing and osteophytosis are present at multiple lumbar levels. Facet osteoarthritis is present in the lower lumbar spine. A catheter overlies the pelvis. IMPRESSION: Degenerative disc disease and facet osteoarthritis of the lumbar spine. Electronically signed by Jagdish Montero MD on 9/24/2018 8:27 PM     Xr Hips Bilateral 2 Views    Result Date: 9/24/2018  REASON FOR EXAM: pain, fall TECHNICAL FACTORS: 2 views of both hips COMPARISON: None FINDINGS: There is no  evidence of acute fracture. There is no evidence of subluxation. Joint spaces are maintained. No significant soft tissue swelling is identified. IMPRESSION: No acute findings. Electronically signed by Jagdish Montero MD on 9/24/2018 8:28 PM     Ct Head Wo Contrast    Result Date: 9/24/2018  REASON FOR EXAM: fall TECHNICAL FACTORS: 5 mm contiguous axial CT images were obtained from the foramen magnum to the skull vertex. COMPARISON: None FINDINGS: The ventricles are normal in size and position. There is no evidence of acute intracranial hemorrhage or infarct. There is no evidence of mass, mass effect, or midline shift. Decreased attenuation is present in the periventricular and subcortical white matter. The visualized orbits are normal in appearance. Paranasal sinuses are clear. Osseous structures are unremarkable.     1. No acute intracranial abnormality. 2. Mild chronic small vessel ischemic disease. Electronically signed by Jagdish Montero MD on 9/24/2018 7:56 PM     Xr Chest Ap Portable    Result Date: 9/24/2018  REASON FOR EXAM: pain, fall TECHNICAL FACTORS: One view. COMPARISON: None FINDINGS: Reticulonodular opacities are present throughout the lungs. The cardiac silhouette is normal. There is no evidence of pleural effusion or pneumothorax. Degenerative changes are present in the spine.     Interval development of diffuse reticulonodular opacities throughout the lungs. Considerations include infectious, inflammatory and neoplastic processes. Electronically signed by Jagdish Montero MD on 9/24/2018 8:29 PM     Ct Chest Abdomen Pelvis W Contrast    Result Date: 9/25/2018  EXAM: CT CHEST ABDOMEN PELVIS W CONTRAST CLINICAL HISTORY: Other nonspecific abnormal findings of lung field. TECHNIQUE: Axial images through the chest, abdomen and pelvis were obtained with the use of IV contrast. FINDINGS: No comparison studies are available. CHEST: The lungs demonstrate innumerable pulmonary nodules, ranging in size  from a few millimeters up to 2.9 cm and the right lower lobe. Some of the larger nodules. Have some central necrosis and possible early central cavitation, for example and medial left lower lobe nodule on image 42 of series 2. There is atelectasis/consolidation in the dependent portions of the lungs. Negative for pleural or pericardial effusions. Multiple enlarged hilar and mediastinal lymph nodes, the largest of which is in the subcarinal region measuring 3.2 cm in greatest length with central necrosis. Negative for inferior neck or axillary lymph nodes. The airways are patent. The thyroid gland is normal. The esophagus is normal. ABDOMEN: The gallbladder is full of gallstones. There is fatty infiltration of the pancreas. The liver is heterogeneous in appearance, with a low density nodule or cyst in the caudate lobe measuring 1 cm. The liver, spleen and gallbladder otherwise appear normal. The pancreas is unremarkable. Kidneys and adrenal glands are normal. The biliary tree is normal. There are multiple enlarged necrotic retroperitoneal and bilateral iliac chain lymph nodes, the largest of which is in the left jugular chain measuring 2.3 cm on image 99 of series 2. Negative for ascites or inflammatory changes noted within the abdomen or pelvis. Vascular calcifications are present without aneurysmal change. The portal vein is patent. The bowel appears normal. I do not see a normal or an abnormal appendix. Scattered clonic diverticula. PELVIS: The urinary bladder is unremarkable. There is a device in the uterine cavity, which is adjacent to an irregular hypodense mass that appears to be centered in the endometrium, which measures approximately 6.6 x 6.2 x 5.2 cm. The rest of the female pelvic organs are normal. Numerous pelvic phleboliths noted. Atrophy of the abdominal wall musculature. Tiny fat filled umbilical hernia. Abdominal wall is otherwise intact. There is diffuse heterogeneity to the lower thoracic and  lumbar vertebral bodies and bilateral pelvis which could be related to osteopenia versus innumerable bone lesions. Marked degenerative changes of both shoulder girdles, with left greater than right shoulder joint effusions noted, with intra-articular loose bodies present. Negative for significant spinal canal stenosis. Negative for groin adenopathy. IMPRESSION: 1. Markedly abnormal study. There are in numerable pulmonary nodules ranging in size from a few millimeters up to 2.9 cm in the right lower lobe. Many of the larger nodules are essentially necrotic one appearing to be frankly cavitary in the medial right lower lobe. This is associated with multiple enlarged necrotic appearing mediastinal lymph nodes (largest in the subcarinal region measuring 3.2 cm), as well as retroperitoneal and iliac chain lymph nodes (largest of which measures 2.3 cm on the left jugular chain). There is a 6.6 x 6.2 x 5.2 cm soft tissue mass involving the uterus, replacing the endometrium and surrounding an intrauterine device. There is heterogeneity to the lower thoracic and lumbar spine vertebra as well as the bilateral hemipelvis, which could be related to marked disuse osteopenia versus innumerable tiny bony lytic lesions. Findings are most consistent with a widely metastatic process involving multiple chest and abdominal lymph nodes, all of the lungs and most likely most of the osseous structures visualized, possibly arising from an endometrial source. 2. There is dependent atelectasis in the lung bases. 3. There are marked degenerative changes of both shoulder girdles, with large lateral shoulder joint effusions and intrathecal loose bodies. 4. Gallbladder completely full of gallstones. 5. Other nonemergent findings as described above. All CT scans at [this location] are performed using dose modulation techniques as appropriate to a performed exam including the following: automated exposure control; adjustment of the mA and/or kV  according to patient size (this includes techniques or standardized protocols for targeted exams where dose is matched to indication / reason for exam; i.e. extremities or head); use of iterative reconstruction technique. Finalized on: 9/25/2018 2:29 PM By: Vinnie Casey MD [facility]# [id number] 2018-09-25 14:31:[id number] [facility]      Arrival mode:  EMS      PCP: Matilde Best MD       Past Medical History:  Past Medical History:   Diagnosis Date    Allergy     Cataract     Osteoporosis     Spine    Primary osteoarthritis of right knee 5/9/2015    Suicide and self-inflicted injury by other specified means     Tried to starve her self 23 years ago after a divorce. Hospitilized for one day. No residual probems now.     Type II or unspecified type diabetes mellitus without mention of complication, not stated as uncontrolled     Uterine cancer        Past Surgical History:  Past Surgical History:   Procedure Laterality Date    APPENDECTOMY      EYE SURGERY  06/03/2014    IUD removal      KNEE SURGERY           Family History:  Family History   Problem Relation Age of Onset    Stroke Brother     Stroke Brother     Diabetes Mother     Heart disease Father        Social History:  Social History     Tobacco Use    Smoking status: Never Smoker    Smokeless tobacco: Never Used   Substance and Sexual Activity    Alcohol use: No    Drug use: No    Sexual activity: No       ROS   Review of Systems   Reason unable to perform ROS: ROS limited due to pt's AMS.       Physical Exam      Initial Vitals [10/02/18 2235]   BP Pulse Resp Temp SpO2   (!) 145/74 88 18 97.9 °F (36.6 °C) 98 %      MAP       --          Physical Exam  Nursing Notes and Vital Signs Reviewed.  Constitutional: Patient is in no acute distress.   Head: Atraumatic. Normocephalic.  Eyes: PERRL. EOM intact. Conjunctivae are not pale. No scleral icterus.  ENT: Mucous membranes are moist. Oropharynx is clear and symmetric.    Neck: Supple. Full ROM. No  "lymphadenopathy.  Cardiovascular: Regular rate. Regular rhythm. No murmurs, rubs, or gallops. Distal pulses are 2+ and symmetric.  Pulmonary/Chest: No respiratory distress. Clear to auscultation bilaterally. No wheezing or rales.  Abdominal: Soft and non-distended.    Musculoskeletal: Moves all extremities. No obvious deformities. No edema.   Skin: Warm and dry.  Neurological: Awake. No able to answer questions. Lethargic. Weak.  Psychiatric: Normal affect. Good eye contact. Appropriate in content.    ED Course    Procedures  ED Vital Signs:  Vitals:    10/02/18 2235 10/02/18 2240 10/03/18 0105   BP: (!) 145/74 133/73 (!) 164/59   Pulse: 88 107 103   Resp: 18 20 20   Temp: 97.9 °F (36.6 °C)     TempSrc: Oral     SpO2: 98% 95% 98%   Weight: 88.1 kg (194 lb 3.2 oz)     Height: 5' 2" (1.575 m)         Abnormal Lab Results:  Labs Reviewed   CBC W/ AUTO DIFFERENTIAL - Abnormal; Notable for the following components:       Result Value    WBC 15.81 (*)     Hemoglobin 10.6 (*)     Hematocrit 32.9 (*)     MCV 79 (*)     MCH 25.4 (*)     RDW 20.3 (*)     Platelets 387 (*)     Gran # (ANC) 12.1 (*)     Mono # 2.0 (*)     Gran% 76.5 (*)     Lymph% 11.1 (*)     All other components within normal limits   COMPREHENSIVE METABOLIC PANEL - Abnormal; Notable for the following components:    BUN, Bld 30 (*)     Calcium 16.0 (*)     Albumin 2.7 (*)     AST 51 (*)     All other components within normal limits    Narrative:     CA critical result(s) called and verbal readback obtained from Hussein Jimenez RN , 10/03/2018 00:14   LIPASE - Abnormal; Notable for the following components:    Lipase 87 (*)     All other components within normal limits   CULTURE, BLOOD   CULTURE, BLOOD   URINALYSIS, REFLEX TO URINE CULTURE    Narrative:     Preferred Collection Type->Urine, Clean Catch   LACTIC ACID, PLASMA        All Lab Results:  Results for orders placed or performed during the hospital encounter of 10/02/18   CBC W/ AUTO DIFFERENTIAL "   Result Value Ref Range    WBC 15.81 (H) 3.90 - 12.70 K/uL    RBC 4.17 4.00 - 5.40 M/uL    Hemoglobin 10.6 (L) 12.0 - 16.0 g/dL    Hematocrit 32.9 (L) 37.0 - 48.5 %    MCV 79 (L) 82 - 98 fL    MCH 25.4 (L) 27.0 - 31.0 pg    MCHC 32.2 32.0 - 36.0 g/dL    RDW 20.3 (H) 11.5 - 14.5 %    Platelets 387 (H) 150 - 350 K/uL    MPV 10.3 9.2 - 12.9 fL    Gran # (ANC) 12.1 (H) 1.8 - 7.7 K/uL    Lymph # 1.8 1.0 - 4.8 K/uL    Mono # 2.0 (H) 0.3 - 1.0 K/uL    Eos # 0.0 0.0 - 0.5 K/uL    Baso # 0.01 0.00 - 0.20 K/uL    Gran% 76.5 (H) 38.0 - 73.0 %    Lymph% 11.1 (L) 18.0 - 48.0 %    Mono% 12.3 4.0 - 15.0 %    Eosinophil% 0.0 0.0 - 8.0 %    Basophil% 0.1 0.0 - 1.9 %    Differential Method Automated    Comp. Metabolic Panel   Result Value Ref Range    Sodium 137 136 - 145 mmol/L    Potassium 3.7 3.5 - 5.1 mmol/L    Chloride 99 95 - 110 mmol/L    CO2 27 23 - 29 mmol/L    Glucose 102 70 - 110 mg/dL    BUN, Bld 30 (H) 8 - 23 mg/dL    Creatinine 0.7 0.5 - 1.4 mg/dL    Calcium 16.0 (HH) 8.7 - 10.5 mg/dL    Total Protein 7.6 6.0 - 8.4 g/dL    Albumin 2.7 (L) 3.5 - 5.2 g/dL    Total Bilirubin 0.5 0.1 - 1.0 mg/dL    Alkaline Phosphatase 75 55 - 135 U/L    AST 51 (H) 10 - 40 U/L    ALT 13 10 - 44 U/L    Anion Gap 11 8 - 16 mmol/L    eGFR if African American >60 >60 mL/min/1.73 m^2    eGFR if non African American >60 >60 mL/min/1.73 m^2   Lipase   Result Value Ref Range    Lipase 87 (H) 4 - 60 U/L   Urinalysis, Reflex to Urine Culture Urine, Clean Catch   Result Value Ref Range    Specimen UA Urine, Clean Catch     Color, UA Yellow Yellow, Straw, Ana    Appearance, UA Clear Clear    pH, UA 6.0 5.0 - 8.0    Specific Gravity, UA 1.020 1.005 - 1.030    Protein, UA Negative Negative    Glucose, UA Negative Negative    Ketones, UA Negative Negative    Bilirubin (UA) Negative Negative    Occult Blood UA Negative Negative    Nitrite, UA Negative Negative    Urobilinogen, UA Negative <2.0 EU/dL    Leukocytes, UA Negative Negative         Imaging  Results:  Imaging Results          CT Head Without Contrast (Final result)  Result time 10/02/18 23:47:38    Final result by Dav Miller MD (10/02/18 23:47:38)                 Impression:      No acute findings.    All CT scans at this facility are performed  using dose modulation techniques as appropriate to performed exam including the following:  automated exposure control; adjustment of mA and/or kV according to the patients size (this includes techniques or standardized protocols for targeted exams where dose is matched to indication/reason for exam: i.e. extremities or head);  iterative reconstruction technique.      Electronically signed by: Dav Miller MD  Date:    10/02/2018  Time:    23:47             Narrative:    EXAMINATION:  CT HEAD WITHOUT CONTRAST    CLINICAL HISTORY:  Confusion/delirium, altered LOC, unexplained;    FINDINGS:  No intracranial hemorrhage or acute findings are demonstrated.  Mild cerebral atrophy.  Low-density changes in the white matter suggests chronic small vessel ischemia.  The visualized paranasal sinuses are clear. The calvarium is intact.                               X-Ray Chest AP Portable (Final result)  Result time 10/02/18 23:28:49    Final result by Dav Miller MD (10/02/18 23:28:49)                 Impression:      Innumerable bilateral pulmonary nodules raising the question of metastatic disease.      Electronically signed by: Dav Miller MD  Date:    10/02/2018  Time:    23:28             Narrative:    EXAMINATION:  XR CHEST AP PORTABLE    CLINICAL HISTORY:  chest pain;    FINDINGS:  Heart is normal in size.  The aorta is atherosclerotic.  There are 2 pulmonary nodules scattered throughout both lung fields raising the question of diffuse metastatic disease.                               The EKG was ordered, reviewed, and independently interpreted by the ED provider.  Interpretation time: 2247  Rate: 105 BPM  Rhythm: atrial fibrillation with RVR  Interpretation:  Moderate voltage criteria for LVH. Inferior infarct. Anterolateral infarct. No STEMI.             The Emergency Provider reviewed the vital signs and test results, which are outlined above.    ED Discussion      12:26 AM: D/w family all pertinent results. D/w family any concerns expressed at this time. Answered all questions. Family expresses understanding at this time.    1:59 AM: Discussed case with Dr. Alcazar ([facility]) who agrees with current care and management of pt and accepts admission.   Admitting Service: [facility]   Admitting Physician: Dr. Alcazar  Admit to: Obs  Med-Tele    2:00 AM: Re-evaluated pt. I have discussed test results, shared treatment plan, and the need for admission with family. Family expresses understanding at this time and agree with all information. All questions answered. Family has no further questions or concerns at this time. Pt is ready for admit.      ED Medication(s):  Medications   sodium chloride 0.9% bolus 1,000 mL (1,000 mLs Intravenous New Bag 10/3/18 0015)   sodium chloride 0.9% bolus 1,000 mL (1,000 mLs Intravenous New Bag 10/3/18 0030)             Medical Decision Making    Medical Decision Making:   Clinical Tests:   Lab Tests: Ordered and Reviewed  Radiological Study: Ordered and Reviewed  Medical Tests: Ordered and Reviewed           Scribe Attestation:   Scribe #1: I performed the above scribed service and the documentation accurately describes the services I performed. I attest to the accuracy of the note.    Attending:   Physician Attestation Statement for Scribe #1: I, Kirti Klein Do, MD, personally performed the services described in this documentation, as scribed by Sonya De Leon, in my presence, and it is both accurate and complete.          Clinical Impression       ICD-10-CM ICD-9-CM   1. Hypercalcemia E83.52 275.42       Disposition:   Disposition: Placed in Observation  Condition: Fair         Kirti Klein Do, MD  10/03/18 0638

## 2018-10-03 NOTE — PROGRESS NOTES
Patient d/c'd to inpatient hospice at [facility], [facility] provided with paperwork, family at bedside. [facility] has left with patient.

## 2018-10-03 NOTE — HOSPITAL COURSE
The pt was placed in observation for hypercalcemia secondary to extensive metastatic disease involving the thoracic and lumbar spine with lytic lesions, extensive abdominal lymphadenopathy, mediastinal lymphadenopathy, multiple metastatic pulmonary nodules.  Laboratory data revealed calcium of 16. Pt is DNR. Discussed therapeutic options versus comfort care. Hospice philosophy discussed with pt's family. Patient's two daughters and son-in-law in agreement and requested inpatient hospice at [facility]. The pt was discharged to the [facility].

## 2018-10-08 LAB
BACTERIA BLD CULT: NORMAL
BACTERIA BLD CULT: NORMAL